# Patient Record
Sex: FEMALE | Race: OTHER
[De-identification: names, ages, dates, MRNs, and addresses within clinical notes are randomized per-mention and may not be internally consistent; named-entity substitution may affect disease eponyms.]

---

## 2019-03-07 ENCOUNTER — HOSPITAL ENCOUNTER (EMERGENCY)
Dept: HOSPITAL 15 - ER | Age: 39
Discharge: HOME | End: 2019-03-07
Payer: SELF-PAY

## 2019-03-07 VITALS — DIASTOLIC BLOOD PRESSURE: 84 MMHG | SYSTOLIC BLOOD PRESSURE: 124 MMHG

## 2019-03-07 VITALS — BODY MASS INDEX: 23.99 KG/M2 | HEIGHT: 59 IN | WEIGHT: 119 LBS

## 2019-03-07 DIAGNOSIS — R20.2: ICD-10-CM

## 2019-03-07 DIAGNOSIS — M54.2: ICD-10-CM

## 2019-03-07 DIAGNOSIS — G44.209: ICD-10-CM

## 2019-03-07 DIAGNOSIS — M62.838: Primary | ICD-10-CM

## 2019-03-07 PROCEDURE — 96372 THER/PROPH/DIAG INJ SC/IM: CPT

## 2019-03-07 PROCEDURE — 99283 EMERGENCY DEPT VISIT LOW MDM: CPT

## 2019-03-07 RX ADMIN — METHYLPREDNISOLONE SODIUM SUCCINATE ONE MG: 125 INJECTION, POWDER, FOR SOLUTION INTRAMUSCULAR; INTRAVENOUS at 05:48

## 2019-03-07 RX ADMIN — BACLOFEN ONE MG: 10 TABLET ORAL at 05:48

## 2025-01-13 ENCOUNTER — HOSPITAL ENCOUNTER (EMERGENCY)
Dept: HOSPITAL 15 - ER | Age: 45
LOS: 1 days | Discharge: HOME | End: 2025-01-14
Payer: MEDICAID

## 2025-01-13 VITALS
OXYGEN SATURATION: 100 % | DIASTOLIC BLOOD PRESSURE: 98 MMHG | SYSTOLIC BLOOD PRESSURE: 152 MMHG | RESPIRATION RATE: 18 BRPM | HEART RATE: 76 BPM

## 2025-01-13 VITALS — TEMPERATURE: 99.3 F

## 2025-01-13 VITALS — BODY MASS INDEX: 23.33 KG/M2 | WEIGHT: 115.74 LBS | HEIGHT: 59 IN
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
## 2025-01-13 DIAGNOSIS — S20.219A: ICD-10-CM

## 2025-01-13 DIAGNOSIS — S16.1XXA: Primary | ICD-10-CM
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
## 2025-01-13 PROCEDURE — 72040 X-RAY EXAM NECK SPINE 2-3 VW: CPT

## 2025-01-13 PROCEDURE — 71046 X-RAY EXAM CHEST 2 VIEWS: CPT

## 2025-01-13 NOTE — DV[facility]
EXAM: XY CHEST TWO VIEWS ROUTINE



CLINICAL HISTORY: CHEST WALL PAIN



TECHNIQUE: Frontal and lateral views of the chest



WID: 



COMPARISON: None



FINDINGS: 



Lines and tubes: None



Chest: 



The heart size and pulmonary vasculature is within normal limits.



No pleural effusion, pneumothorax, or consolidation.



The osseous structures are grossly intact.



IMPRESSION: 



No acute cardiopulmonary abnormality.



Electronically Signed by: Sabiha Ward at 01/13/2025 21:30:24 PM
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
## 2025-01-13 NOTE — [facility]
EXAM: XY CERVICAL SPINE 3V



INDICATION: NECK PAIN



COMPARISON: None



TECHNIQUE: 4 views of the cervical spine were obtained.



Findings:



There is no evidence of an acute fracture, spondylolysis, or spondylolisthesis.



The vertebral body heights and disc spaces are well-maintained.  Straightening of the cervical lordos
is.   



No blastic or lytic lesions are appreciated.



No radiopaque foreign bodies.



No superficial soft tissue abnormalities.



Impression: 



1. No acute osseous abnormality.



2. Straightening of the cervical lordosis which may be positional versus muscle spasm.   



Electronically Signed by: Yolande Turner at 01/13/2025 21:30:57 PM